# Patient Record
Sex: FEMALE | Race: BLACK OR AFRICAN AMERICAN | NOT HISPANIC OR LATINO
[De-identification: names, ages, dates, MRNs, and addresses within clinical notes are randomized per-mention and may not be internally consistent; named-entity substitution may affect disease eponyms.]

---

## 2020-06-16 DIAGNOSIS — Z01.818 ENCOUNTER FOR OTHER PREPROCEDURAL EXAMINATION: ICD-10-CM

## 2020-06-19 ENCOUNTER — APPOINTMENT (OUTPATIENT)
Dept: SURGERY | Facility: CLINIC | Age: 55
End: 2020-06-19
Payer: COMMERCIAL

## 2020-06-19 VITALS
SYSTOLIC BLOOD PRESSURE: 114 MMHG | TEMPERATURE: 97.8 F | RESPIRATION RATE: 16 BRPM | DIASTOLIC BLOOD PRESSURE: 80 MMHG | OXYGEN SATURATION: 100 % | HEART RATE: 62 BPM

## 2020-06-19 DIAGNOSIS — M79.89 OTHER SPECIFIED SOFT TISSUE DISORDERS: ICD-10-CM

## 2020-06-19 DIAGNOSIS — M54.9 DORSALGIA, UNSPECIFIED: ICD-10-CM

## 2020-06-19 DIAGNOSIS — K80.20 CALCULUS OF GALLBLADDER W/OUT CHOLECYSTITIS W/OUT OBSTRUCTION: ICD-10-CM

## 2020-06-19 DIAGNOSIS — J45.909 UNSPECIFIED ASTHMA, UNCOMPLICATED: ICD-10-CM

## 2020-06-19 DIAGNOSIS — Z78.9 OTHER SPECIFIED HEALTH STATUS: ICD-10-CM

## 2020-06-19 DIAGNOSIS — E66.01 MORBID (SEVERE) OBESITY DUE TO EXCESS CALORIES: ICD-10-CM

## 2020-06-19 DIAGNOSIS — Z83.3 FAMILY HISTORY OF DIABETES MELLITUS: ICD-10-CM

## 2020-06-19 PROCEDURE — 99204 OFFICE O/P NEW MOD 45 MIN: CPT

## 2020-06-19 RX ORDER — MULTIVIT-MIN/IRON/FOLIC ACID/K 18-600-40
CAPSULE ORAL
Refills: 0 | Status: ACTIVE | COMMUNITY

## 2020-06-19 RX ORDER — BACILLUS COAGULANS/INULIN 1B-250 MG
CAPSULE ORAL
Refills: 0 | Status: ACTIVE | COMMUNITY

## 2020-06-19 RX ORDER — MULTIVIT-MIN/FA/LYCOPEN/LUTEIN .4-300-25
TABLET ORAL
Refills: 0 | Status: ACTIVE | COMMUNITY

## 2020-06-19 NOTE — HISTORY OF PRESENT ILLNESS
[de-identified] :  GERARDO ADAMS is a 54 year old female here for consultation for weight loss surgery.  54-year-old female 5 foot 2 266 pounds with a BMI of 48.  She has been having most of her adult life has been on multiple diet programs in the past losing up to 10 pounds at any one time always gaining that weight back she is looking for more permanent solution to her weight loss problem she is interested in a sleeve gastrectomy.\par

## 2020-06-19 NOTE — ASSESSMENT
[FreeTextEntry1] : 54-year-old female 5 foot 2 266 pounds with a BMI of 48 she would like to be considered for a sleeve gastrectomy I believe she would be an excellent candidate she will undergo her usual medical nutritional and psychological work-up attend a preoperative support group meeting and return for second office visit once these are completed the risks benefits and expectations were discussed at length with the patient all of her questions were answered

## 2020-06-23 ENCOUNTER — RESULT REVIEW (OUTPATIENT)
Age: 55
End: 2020-06-23

## 2020-06-24 ENCOUNTER — EMERGENCY (EMERGENCY)
Facility: HOSPITAL | Age: 55
LOS: 1 days | Discharge: HOME | End: 2020-06-24
Admitting: EMERGENCY MEDICINE
Payer: COMMERCIAL

## 2020-06-24 VITALS
HEIGHT: 63 IN | TEMPERATURE: 98 F | SYSTOLIC BLOOD PRESSURE: 111 MMHG | WEIGHT: 266.1 LBS | DIASTOLIC BLOOD PRESSURE: 68 MMHG | HEART RATE: 86 BPM | OXYGEN SATURATION: 100 % | RESPIRATION RATE: 16 BRPM

## 2020-06-24 PROCEDURE — 99284 EMERGENCY DEPT VISIT MOD MDM: CPT

## 2020-06-24 PROCEDURE — 73630 X-RAY EXAM OF FOOT: CPT | Mod: 26,LT

## 2020-06-24 PROCEDURE — 73610 X-RAY EXAM OF ANKLE: CPT | Mod: 26,LT

## 2020-06-24 PROCEDURE — 93971 EXTREMITY STUDY: CPT | Mod: 26,LT

## 2020-06-24 RX ORDER — IBUPROFEN 200 MG
600 TABLET ORAL ONCE
Refills: 0 | Status: COMPLETED | OUTPATIENT
Start: 2020-06-24 | End: 2020-06-24

## 2020-06-24 RX ADMIN — Medication 600 MILLIGRAM(S): at 19:00

## 2020-06-24 NOTE — ED PROVIDER NOTE - NS ED ROS FT
ROS:  GENERAL: No fever, no chills  EYES: no change in vision  HEENT: no trouble swallowing, no trouble speaking  CARDIAC: no chest pain  PULMONARY: no cough, no shortness of breath  GI: no abdominal pain, no nausea, no vomiting, no diarrhea, no constipation  : No dysuria, no frequency, no change in appearance, or odor of urine  SKIN: no rashes  NEURO: no headache, no weakness  MSK: +ankle pain

## 2020-06-24 NOTE — ED PROVIDER NOTE - CLINICAL SUMMARY MEDICAL DECISION MAKING FREE TEXT BOX
55 y/o F PMHx asthma, morbid obesity, here c/o atraumatic left ankle pain x 1 week. Has mild calf tenderness, but no dvt risk factors. Will do xray ankle, venous duplex to r/o dvt, motrin for pain. Ankle pain likely 2/2 OA 2/2 obesity.

## 2020-06-24 NOTE — ED PROVIDER NOTE - PROGRESS NOTE DETAILS
PA Hamilton- patient received at sign out pending Doppler results. Pt xrays were wnl. Doppler negative. Results reviewed and discussed with patient. Advise RICE and NSAID use prn for pain and patient advised to f/u with PCP and ortho/podiatry. Pt understood and agreed with plan. All questions and concerns addressed. Strict return instructions given. No complaints noted.

## 2020-06-24 NOTE — ED ADULT TRIAGE NOTE - HEIGHT IN CM
Reason for Call:  Form, our goal is to have forms completed with 72 hours, however, some forms may require a visit or additional information.    Type of letter, form or note:  CPAP/BIPAPA written order    Who is the form from?: Home medical (if other please explain)    Where did the form come from: form was faxed in    What clinic location was the form placed at?: [place]    Where the form was placed: Given to Feli Patel NP in Dr Brasher's absence     What number is listed as a contact on the form?: [phone]       Additional comments: Fax to [phone] when signed along with 04/03/2019 office visit notes    Call taken on 3/24/2020 at 10:40 AM by Valeriano Vivas CMA         160.02

## 2020-06-24 NOTE — ED ADULT NURSE NOTE - OBJECTIVE STATEMENT
Pt a&ox3, calm and cooperative, c/o of intermittent left ankle pain started last week, radiates to left leg. No relief with motrin. pt denies any other medical discomfort at the moment. respirations even/unlabored, nad noted, pt able to perfprm ROM. pending xray

## 2020-06-24 NOTE — ED PROVIDER NOTE - NSFOLLOWUPINSTRUCTIONS_ED_ALL_ED_FT
Follow up with your PMD within 48-72 hours.  Recommend orthopedic follow up within the week. Referral list provided. Rest, ice and elevate.  Take Motrin 600mg every 8hrs for pain with food.  Ambulate as tolerated with ace wrap. Worsening pain, swelling, numbness, weakness return to ER

## 2020-06-24 NOTE — ED PROVIDER NOTE - PHYSICAL EXAMINATION
Vital signs reviewed.   CONSTITUTIONAL: Well-appearing; well-nourished; in no apparent distress. Non-toxic appearing.   HEAD: Normocephalic, atraumatic.  EYES: PERRL, EOM intact, conjunctiva and sclera WNL.  ENT: normal nose; no rhinorrhea; normal pharynx with no tonsillar hypertrophy, no erythema, no exudate, no lymphadenopathy.  NECK/LYMPH: Supple; non-tender; no cervical lymphadenopathy.  CARD: Normal S1, S2; no lower ext edema diaz ,+left calf tenderness  RESP: Normal chest excursion with respiration; breath sounds clear and equal bilaterally; no wheezes, rhonchi, or rales.  ABD/GI: soft, non-distended; non-tender; no palpable organomegaly, no pulsatile mass.  EXT/MS: no midline tenderness, distal pulses are normal, no pedal edema.  L ankle: minimal swelling noted to lateral ankle, no visible deformity or ecchymosis noted, pain on palpation of lateral joint line, full rom w/ some tenderness, dp pulse 2+, sensation intact, able to ambulate with limp.   SKIN: Normal for age and race; warm; dry; good turgor; no apparent lesions or exudate noted.  NEURO: Awake, alert, oriented x 3, no gross deficits, CN II-XII grossly intact, no motor or sensory deficit noted.  PSYCH: Normal mood; appropriate affect.

## 2020-06-24 NOTE — ED PROVIDER NOTE - OBJECTIVE STATEMENT
53 y/o F PMHx asthma, morbid obesity, here c/o atraumatic left ankle pain x 1 week. States she initially took Motrin w/ relief of pain, however pain then recurred. Normally has edema in diaz lower extremities, but states she has noticed more swelling in the left ankle vs right. Was told by her family to go to the ED to r/o blood clot. No personal or fam hx of blood clots. No ocp use. Social smoker. No recent travel or immobilization. No injuries to the ankle.

## 2020-06-24 NOTE — ED PROVIDER NOTE - PATIENT PORTAL LINK FT
You can access the FollowMyHealth Patient Portal offered by [facility] by registering at the following website: [email or web address]. By joining [employer]’s FollowMyHealth portal, you will also be able to view your health information using other applications (apps) compatible with our system.

## 2023-07-19 PROBLEM — J45.909 UNSPECIFIED ASTHMA, UNCOMPLICATED: Chronic | Status: ACTIVE | Noted: 2020-06-24

## 2023-09-11 ENCOUNTER — APPOINTMENT (OUTPATIENT)
Dept: VASCULAR SURGERY | Facility: CLINIC | Age: 58
End: 2023-09-11
Payer: COMMERCIAL

## 2023-09-11 VITALS
HEART RATE: 70 BPM | HEIGHT: 63 IN | DIASTOLIC BLOOD PRESSURE: 73 MMHG | BODY MASS INDEX: 31.01 KG/M2 | SYSTOLIC BLOOD PRESSURE: 106 MMHG | WEIGHT: 175 LBS

## 2023-09-11 VITALS — HEART RATE: 69 BPM | DIASTOLIC BLOOD PRESSURE: 61 MMHG | SYSTOLIC BLOOD PRESSURE: 93 MMHG

## 2023-09-11 PROCEDURE — 93922 UPR/L XTREMITY ART 2 LEVELS: CPT

## 2023-09-11 PROCEDURE — 99203 OFFICE O/P NEW LOW 30 MIN: CPT

## 2023-09-11 PROCEDURE — 93970 EXTREMITY STUDY: CPT

## 2024-05-02 ENCOUNTER — EMERGENCY (EMERGENCY)
Facility: HOSPITAL | Age: 59
LOS: 1 days | Discharge: HOME | End: 2024-05-02
Attending: EMERGENCY MEDICINE | Admitting: EMERGENCY MEDICINE
Payer: COMMERCIAL

## 2024-05-02 VITALS
RESPIRATION RATE: 17 BRPM | SYSTOLIC BLOOD PRESSURE: 105 MMHG | DIASTOLIC BLOOD PRESSURE: 59 MMHG | TEMPERATURE: 98 F | HEART RATE: 59 BPM | OXYGEN SATURATION: 100 %

## 2024-05-02 VITALS
OXYGEN SATURATION: 99 % | HEART RATE: 62 BPM | RESPIRATION RATE: 16 BRPM | SYSTOLIC BLOOD PRESSURE: 103 MMHG | DIASTOLIC BLOOD PRESSURE: 66 MMHG | TEMPERATURE: 98 F

## 2024-05-02 LAB
ALBUMIN SERPL ELPH-MCNC: 3.3 G/DL — SIGNIFICANT CHANGE UP (ref 3.3–5)
ALP SERPL-CCNC: 62 U/L — SIGNIFICANT CHANGE UP (ref 40–120)
ALT FLD-CCNC: 13 U/L — SIGNIFICANT CHANGE UP (ref 4–33)
ANION GAP SERPL CALC-SCNC: 9 MMOL/L — SIGNIFICANT CHANGE UP (ref 7–14)
ANISOCYTOSIS BLD QL: SLIGHT — SIGNIFICANT CHANGE UP
APPEARANCE UR: CLEAR — SIGNIFICANT CHANGE UP
AST SERPL-CCNC: 17 U/L — SIGNIFICANT CHANGE UP (ref 4–32)
BACTERIA # UR AUTO: NEGATIVE /HPF — SIGNIFICANT CHANGE UP
BASOPHILS # BLD AUTO: 0.03 K/UL — SIGNIFICANT CHANGE UP (ref 0–0.2)
BASOPHILS NFR BLD AUTO: 0.9 % — SIGNIFICANT CHANGE UP (ref 0–2)
BILIRUB SERPL-MCNC: <0.2 MG/DL — SIGNIFICANT CHANGE UP (ref 0.2–1.2)
BILIRUB UR-MCNC: NEGATIVE — SIGNIFICANT CHANGE UP
BUN SERPL-MCNC: 16 MG/DL — SIGNIFICANT CHANGE UP (ref 7–23)
CALCIUM SERPL-MCNC: 8.3 MG/DL — LOW (ref 8.4–10.5)
CAST: 1 /LPF — SIGNIFICANT CHANGE UP (ref 0–4)
CHLORIDE SERPL-SCNC: 107 MMOL/L — SIGNIFICANT CHANGE UP (ref 98–107)
CO2 SERPL-SCNC: 25 MMOL/L — SIGNIFICANT CHANGE UP (ref 22–31)
COLOR SPEC: YELLOW — SIGNIFICANT CHANGE UP
CREAT SERPL-MCNC: 0.91 MG/DL — SIGNIFICANT CHANGE UP (ref 0.5–1.3)
DIFF PNL FLD: ABNORMAL
EGFR: 73 ML/MIN/1.73M2 — SIGNIFICANT CHANGE UP
EOSINOPHIL # BLD AUTO: 0.09 K/UL — SIGNIFICANT CHANGE UP (ref 0–0.5)
EOSINOPHIL NFR BLD AUTO: 2.6 % — SIGNIFICANT CHANGE UP (ref 0–6)
GIANT PLATELETS BLD QL SMEAR: PRESENT — SIGNIFICANT CHANGE UP
GLUCOSE SERPL-MCNC: 83 MG/DL — SIGNIFICANT CHANGE UP (ref 70–99)
GLUCOSE UR QL: NEGATIVE MG/DL — SIGNIFICANT CHANGE UP
HCT VFR BLD CALC: 33.5 % — LOW (ref 34.5–45)
HGB BLD-MCNC: 10.7 G/DL — LOW (ref 11.5–15.5)
IANC: 1.61 K/UL — LOW (ref 1.8–7.4)
KETONES UR-MCNC: NEGATIVE MG/DL — SIGNIFICANT CHANGE UP
LEUKOCYTE ESTERASE UR-ACNC: NEGATIVE — SIGNIFICANT CHANGE UP
LIDOCAIN IGE QN: 18 U/L — SIGNIFICANT CHANGE UP (ref 7–60)
LYMPHOCYTES # BLD AUTO: 1.09 K/UL — SIGNIFICANT CHANGE UP (ref 1–3.3)
LYMPHOCYTES # BLD AUTO: 31.6 % — SIGNIFICANT CHANGE UP (ref 13–44)
MCHC RBC-ENTMCNC: 28.2 PG — SIGNIFICANT CHANGE UP (ref 27–34)
MCHC RBC-ENTMCNC: 31.9 GM/DL — LOW (ref 32–36)
MCV RBC AUTO: 88.2 FL — SIGNIFICANT CHANGE UP (ref 80–100)
MONOCYTES # BLD AUTO: 0.3 K/UL — SIGNIFICANT CHANGE UP (ref 0–0.9)
MONOCYTES NFR BLD AUTO: 8.8 % — SIGNIFICANT CHANGE UP (ref 2–14)
NEUTROPHILS # BLD AUTO: 1.94 K/UL — SIGNIFICANT CHANGE UP (ref 1.8–7.4)
NEUTROPHILS NFR BLD AUTO: 56.1 % — SIGNIFICANT CHANGE UP (ref 43–77)
NITRITE UR-MCNC: NEGATIVE — SIGNIFICANT CHANGE UP
OVALOCYTES BLD QL SMEAR: SLIGHT — SIGNIFICANT CHANGE UP
PH UR: 5.5 — SIGNIFICANT CHANGE UP (ref 5–8)
PLAT MORPH BLD: NORMAL — SIGNIFICANT CHANGE UP
PLATELET # BLD AUTO: 172 K/UL — SIGNIFICANT CHANGE UP (ref 150–400)
PLATELET COUNT - ESTIMATE: NORMAL — SIGNIFICANT CHANGE UP
POIKILOCYTOSIS BLD QL AUTO: SLIGHT — SIGNIFICANT CHANGE UP
POLYCHROMASIA BLD QL SMEAR: SLIGHT — SIGNIFICANT CHANGE UP
POTASSIUM SERPL-MCNC: 4 MMOL/L — SIGNIFICANT CHANGE UP (ref 3.5–5.3)
POTASSIUM SERPL-SCNC: 4 MMOL/L — SIGNIFICANT CHANGE UP (ref 3.5–5.3)
PROT SERPL-MCNC: 6 G/DL — SIGNIFICANT CHANGE UP (ref 6–8.3)
PROT UR-MCNC: NEGATIVE MG/DL — SIGNIFICANT CHANGE UP
RBC # BLD: 3.8 M/UL — SIGNIFICANT CHANGE UP (ref 3.8–5.2)
RBC # FLD: 14.1 % — SIGNIFICANT CHANGE UP (ref 10.3–14.5)
RBC BLD AUTO: ABNORMAL
RBC CASTS # UR COMP ASSIST: 6 /HPF — HIGH (ref 0–4)
SODIUM SERPL-SCNC: 141 MMOL/L — SIGNIFICANT CHANGE UP (ref 135–145)
SP GR SPEC: 1.03 — HIGH (ref 1–1.03)
SQUAMOUS # UR AUTO: 2 /HPF — SIGNIFICANT CHANGE UP (ref 0–5)
UROBILINOGEN FLD QL: 1 MG/DL — SIGNIFICANT CHANGE UP (ref 0.2–1)
WBC # BLD: 3.45 K/UL — LOW (ref 3.8–10.5)
WBC # FLD AUTO: 3.45 K/UL — LOW (ref 3.8–10.5)
WBC UR QL: 1 /HPF — SIGNIFICANT CHANGE UP (ref 0–5)

## 2024-05-02 PROCEDURE — 99284 EMERGENCY DEPT VISIT MOD MDM: CPT

## 2024-05-02 RX ORDER — ACETAMINOPHEN 500 MG
1000 TABLET ORAL ONCE
Refills: 0 | Status: COMPLETED | OUTPATIENT
Start: 2024-05-02 | End: 2024-05-02

## 2024-05-02 RX ORDER — ONDANSETRON 8 MG/1
4 TABLET, FILM COATED ORAL ONCE
Refills: 0 | Status: COMPLETED | OUTPATIENT
Start: 2024-05-02 | End: 2024-05-02

## 2024-05-02 RX ORDER — SODIUM CHLORIDE 9 MG/ML
1000 INJECTION INTRAMUSCULAR; INTRAVENOUS; SUBCUTANEOUS ONCE
Refills: 0 | Status: COMPLETED | OUTPATIENT
Start: 2024-05-02 | End: 2024-05-02

## 2024-05-02 RX ADMIN — SODIUM CHLORIDE 1000 MILLILITER(S): 9 INJECTION INTRAMUSCULAR; INTRAVENOUS; SUBCUTANEOUS at 13:18

## 2024-05-02 RX ADMIN — Medication 1000 MILLIGRAM(S): at 13:48

## 2024-05-02 RX ADMIN — ONDANSETRON 4 MILLIGRAM(S): 8 TABLET, FILM COATED ORAL at 13:18

## 2024-05-02 RX ADMIN — Medication 400 MILLIGRAM(S): at 13:18

## 2024-05-02 RX ADMIN — Medication 1000 MILLIGRAM(S): at 13:33

## 2024-05-02 NOTE — ED ADULT TRIAGE NOTE - CHIEF COMPLAINT QUOTE
pt c/o mid abd pain "burning" with nausea and vomiting. pt reports fever yesterday. pt also c/o lower back pain as well. pt denies dysuria. pt was seen at urgent care yesterday, reports +hematuria on UA.

## 2024-05-02 NOTE — ED PROVIDER NOTE - CLINICAL SUMMARY MEDICAL DECISION MAKING FREE TEXT BOX
58-year female, no chronic medical conditions, presents to ED with complaints of 5 days of lower abdominal pain, dysuria, intermittent nausea and vomiting.  Also reports diffuse headache to forehead since yesterday, no relief with Tylenol.  No numbness, tingling, weakness.  Was seen at urgent care yesterday, found to have fever, referred to ED for further evaluation and management.  Patient notes 1 episode of vomiting 3 days ago.  Decreased p.o. because she is worried about vomiting.  No diarrhea or sick contacts.  Last BM this morning.  History of cholecystectomy, no other abdominal surgeries.  Vital signs stable.  Patient well-appearing, no acute distress, heart regular rate and rhythm, lungs clear, abdomen soft with diffuse suprapubic tenderness, no motor or sensory deficits.  Differential includes but not limited to UTI, gastroenteritis.  Will also assess for electrolyte abnormalities.  Plan for basic labs, lipase, analgesics, antiemetics, ivf, UA+culture.

## 2024-05-02 NOTE — ED PROVIDER NOTE - NSFOLLOWUPCLINICS_GEN_ALL_ED_FT
[facility]  Gastroenterology  [address] [place] [address] [address]  [place] [address]  Phone: [phone]  Fax:

## 2024-05-02 NOTE — ED ADULT NURSE NOTE - NSFALLUNIVINTERV_ED_ALL_ED
Bed/Stretcher in lowest position, wheels locked, appropriate side rails in place/Call bell, personal items and telephone in reach/Instruct patient to call for assistance before getting out of bed/chair/stretcher/Non-slip footwear applied when patient is off stretcher/[place] to call system/Physically safe environment - no spills, clutter or unnecessary equipment/Purposeful proactive rounding/Room/bathroom lighting operational, light cord in reach n/a

## 2024-05-02 NOTE — ED PROVIDER NOTE - PHYSICAL EXAMINATION
CONSTITUTIONAL: Well appearing, awake, alert, and in no apparent distress.  HEAD: normocephalic, atraumatic  ENT: oral mucosa moist  CARDIAC: regular rate and rhythm; no murmurs, rubs, or gallops  RESPIRATORY: normal breath sounds, clear to ascultation bilaterally, no rales, rhonchi, wheezing  GASTROINTESTINAL: abdomen nondistended, soft, no guarding, rebound tenderness; +suprapubic tenderness  MSK: Spine appears normal, range of motion is not limited, no muscle or joint tenderness  NEURO: Alert and oriented, no focal deficits, no motor or sensory deficits.  SKIN: Skin normal color for race, warm, dry and intact. No evidence of rash.  PSYCH: appropriate mood and affect

## 2024-05-02 NOTE — ED PROVIDER NOTE - PROGRESS NOTE DETAILS
Oli PGY1: patient updated on results.  labs and ua unremarkable.  Re-evaluated patient, no abdomen pain.  On re-exam, abdomen soft and non-tender.  Discussed urine culture results.  Patient is feeling well and would like to go home.  Discussed plan for discharge with follow up to GI and PCP, patient understands and agrees.

## 2024-05-02 NOTE — ED PROVIDER NOTE - OBJECTIVE STATEMENT
58-year female, no chronic medical conditions, presents to ED with complaints of 5 days of lower abdominal pain, dysuria, intermittent nausea and vomiting.  Also reports diffuse headache to forehead since yesterday, no relief with Tylenol.  No numbness, tingling, weakness.  Was seen at urgent care yesterday, found to have fever, referred to ED for further evaluation and management.  Patient notes 1 episode of vomiting 3 days ago.  Decreased p.o. because she is worried about vomiting.  No diarrhea or sick contacts.  Last BM this morning.  History of cholecystectomy, no other abdominal surgeries.

## 2024-05-02 NOTE — ED PROVIDER NOTE - ATTENDING CONTRIBUTION TO CARE
58-year female, no chronic medical conditions, presents to ED with complaints of 5 days of lower abdominal pain, dysuria, intermittent nausea and vomiting.  Also reports diffuse headache to forehead since yesterday, no relief with Tylenol.  No numbness, tingling, weakness.  Was seen at urgent care yesterday, found to have fever, referred to ED for further evaluation and management.  Patient notes 1 episode of vomiting 3 days ago.  Decreased p.o. because she is worried about vomiting.  No diarrhea or sick contacts.  Last BM this morning.  History of cholecystectomy, no other abdominal surgeries. 58-year female, no chronic medical conditions, presents to ED with complaints of 5 days of lower abdominal pain, dysuria, intermittent nausea and vomiting.  Also reports diffuse headache to forehead since yesterday, no relief with Tylenol.  No numbness, tingling, weakness.  Was seen at urgent care yesterday, found to have fever, referred to ED for further evaluation and management.  Patient notes 1 episode of vomiting 3 days ago.  Decreased p.o. because she is worried about vomiting.  No diarrhea or sick contacts.  Last BM this morning.  History of cholecystectomy, no other abdominal surgeries.  stable

## 2024-05-02 NOTE — ED ADULT NURSE NOTE - OBJECTIVE STATEMENT
Received patient in Intake 12 c/o abdominal pain, nausea, vomiting. Patient denies SOB, chest pain, fever. Patient is A&OX4, airway patent, breathing unlabored and even, radial pulses palpable, abdomen soft, tender. Labs obtained, 20G IV placed on left arm, medications given as ordered, IV fluid bolus infusing. Side rails up and safety maintained. Call bells within reach.

## 2024-05-02 NOTE — ED PROVIDER NOTE - NSFOLLOWUPINSTRUCTIONS_ED_ALL_ED_FT
You were seen in the Emergency Department for abdominal pain, headache, and low back pain.  Your evaluation today shows your symptoms are not from any dangerous or life-threatening causes.  We recommend you follow up with your primary care physician and gastroenterology.  The information of gastroenterology is provided in your paperwork.  At home we recommend you take ibuprofen and/or acetaminophen as needed for pain.        1) Continue all previously prescribed medications as directed.    2) Follow up with your primary care physician - take copies of your results.    3) Return to the Emergency Department for worsening or persistent symptoms, and/or ANY NEW OR CONCERNING SYMPTOMS.

## 2024-05-02 NOTE — ED PROVIDER NOTE - PATIENT PORTAL LINK FT
You can access the FollowMyHealth Patient Portal offered by [facility] by registering at the following website: [email or web address]. By joining [employer]s FollowMyHealth portal, you will also be able to view your health information using other applications (apps) compatible with our system.

## 2024-05-03 LAB
CULTURE RESULTS: SIGNIFICANT CHANGE UP
SPECIMEN SOURCE: SIGNIFICANT CHANGE UP

## 2024-09-20 ENCOUNTER — APPOINTMENT (OUTPATIENT)
Dept: VASCULAR SURGERY | Facility: CLINIC | Age: 59
End: 2024-09-20
Payer: COMMERCIAL

## 2024-09-20 VITALS
SYSTOLIC BLOOD PRESSURE: 101 MMHG | HEIGHT: 63 IN | DIASTOLIC BLOOD PRESSURE: 67 MMHG | WEIGHT: 182 LBS | BODY MASS INDEX: 32.25 KG/M2 | HEART RATE: 53 BPM | TEMPERATURE: 98.02 F

## 2024-09-20 PROCEDURE — 99212 OFFICE O/P EST SF 10 MIN: CPT

## 2024-09-30 ENCOUNTER — APPOINTMENT (OUTPATIENT)
Dept: SURGERY | Facility: CLINIC | Age: 59
End: 2024-09-30
Payer: COMMERCIAL

## 2024-09-30 VITALS
TEMPERATURE: 97.9 F | HEIGHT: 63 IN | SYSTOLIC BLOOD PRESSURE: 112 MMHG | DIASTOLIC BLOOD PRESSURE: 80 MMHG | WEIGHT: 180 LBS | BODY MASS INDEX: 31.89 KG/M2 | HEART RATE: 59 BPM

## 2024-09-30 PROCEDURE — 99202 OFFICE O/P NEW SF 15 MIN: CPT

## 2024-10-15 ENCOUNTER — OUTPATIENT (OUTPATIENT)
Dept: OUTPATIENT SERVICES | Facility: HOSPITAL | Age: 59
LOS: 1 days | Discharge: HOME | End: 2024-10-15
Payer: COMMERCIAL

## 2024-10-15 VITALS
HEART RATE: 59 BPM | WEIGHT: 189.38 LBS | SYSTOLIC BLOOD PRESSURE: 105 MMHG | DIASTOLIC BLOOD PRESSURE: 68 MMHG | OXYGEN SATURATION: 99 % | TEMPERATURE: 98 F | HEIGHT: 63 IN | RESPIRATION RATE: 18 BRPM

## 2024-10-15 DIAGNOSIS — Z90.3 ACQUIRED ABSENCE OF STOMACH [PART OF]: Chronic | ICD-10-CM

## 2024-10-15 DIAGNOSIS — J45.909 UNSPECIFIED ASTHMA, UNCOMPLICATED: ICD-10-CM

## 2024-10-15 DIAGNOSIS — R22.41 LOCALIZED SWELLING, MASS AND LUMP, RIGHT LOWER LIMB: ICD-10-CM

## 2024-10-15 DIAGNOSIS — Z01.818 ENCOUNTER FOR OTHER PREPROCEDURAL EXAMINATION: ICD-10-CM

## 2024-10-15 LAB
ANION GAP SERPL CALC-SCNC: 4 MMOL/L — LOW (ref 5–17)
BUN SERPL-MCNC: 14 MG/DL — SIGNIFICANT CHANGE UP (ref 7–23)
CALCIUM SERPL-MCNC: 9 MG/DL — SIGNIFICANT CHANGE UP (ref 8.5–10.1)
CHLORIDE SERPL-SCNC: 109 MMOL/L — HIGH (ref 96–108)
CO2 SERPL-SCNC: 29 MMOL/L — SIGNIFICANT CHANGE UP (ref 22–31)
CREAT SERPL-MCNC: 0.82 MG/DL — SIGNIFICANT CHANGE UP (ref 0.5–1.3)
EGFR: 82 ML/MIN/1.73M2 — SIGNIFICANT CHANGE UP
GLUCOSE SERPL-MCNC: 71 MG/DL — SIGNIFICANT CHANGE UP (ref 70–99)
HCT VFR BLD CALC: 37.5 % — SIGNIFICANT CHANGE UP (ref 34.5–45)
HGB BLD-MCNC: 11.9 G/DL — SIGNIFICANT CHANGE UP (ref 11.5–15.5)
MCHC RBC-ENTMCNC: 27.8 PG — SIGNIFICANT CHANGE UP (ref 27–34)
MCHC RBC-ENTMCNC: 31.7 G/DL — LOW (ref 32–36)
MCV RBC AUTO: 87.6 FL — SIGNIFICANT CHANGE UP (ref 80–100)
NRBC # BLD: 0 /100 WBCS — SIGNIFICANT CHANGE UP (ref 0–0)
PLATELET # BLD AUTO: 226 K/UL — SIGNIFICANT CHANGE UP (ref 150–400)
POTASSIUM SERPL-MCNC: 3.7 MMOL/L — SIGNIFICANT CHANGE UP (ref 3.5–5.3)
POTASSIUM SERPL-SCNC: 3.7 MMOL/L — SIGNIFICANT CHANGE UP (ref 3.5–5.3)
RBC # BLD: 4.28 M/UL — SIGNIFICANT CHANGE UP (ref 3.8–5.2)
RBC # FLD: 14 % — SIGNIFICANT CHANGE UP (ref 10.3–14.5)
SODIUM SERPL-SCNC: 142 MMOL/L — SIGNIFICANT CHANGE UP (ref 135–145)
WBC # BLD: 5.86 K/UL — SIGNIFICANT CHANGE UP (ref 3.8–10.5)
WBC # FLD AUTO: 5.86 K/UL — SIGNIFICANT CHANGE UP (ref 3.8–10.5)

## 2024-10-15 PROCEDURE — 93010 ELECTROCARDIOGRAM REPORT: CPT

## 2024-10-15 RX ORDER — SODIUM CHLORIDE 9 MG/ML
3 INJECTION, SOLUTION INTRAMUSCULAR; INTRAVENOUS; SUBCUTANEOUS EVERY 8 HOURS
Refills: 0 | Status: DISCONTINUED | OUTPATIENT
Start: 2024-10-29 | End: 2024-11-13

## 2024-10-15 NOTE — H&P PST ADULT - HISTORY OF PRESENT ILLNESS
59F presents to PST for scheduled wide excision of right thigh mass on 10/29/24 with         59F PMH asthma (denies intubation) presents to PST for scheduled wide excision of right thigh mass on 10/29/24 with

## 2024-10-15 NOTE — H&P PST ADULT - ASSESSMENT
59F presents to PST for scheduled wide excision of right thigh mass on 10/29/24 with Dr.Held CAPRINI SCORE    AGE RELATED RISK FACTORS                                                             x[ ] Age 41-60 years                                            (1 Point)  [ ] Age: 61-74 years                                           (2 Points)                 [ ] Age= 75 years                                                (3 Points)             DISEASE RELATED RISK FACTORS                                                       [ ] Edema in the lower extremities                 (1 Point)                     [ ] Varicose veins                                               (1 Point)                                 [x ] BMI > 25 Kg/m2                                            (1 Point)                                  [ ] Serious infection (ie PNA)                            (1 Point)                     [ ] Lung disease ( COPD, Emphysema)            (1 Point)                                                                          [ ] Acute myocardial infarction                         (1 Point)                  [ ] Congestive heart failure (in the previous month)  (1 Point)         [ ] Inflammatory bowel disease                            (1 Point)                  [ ] Central venous access, PICC or Port               (2 points)       (within the last month)                                                                [ ] Stroke (in the previous month)                        (5 Points)    [ ] Previous or present malignancy                       (2 points)                                                                                                                                                         HEMATOLOGY RELATED FACTORS                                                         [ ] Prior episodes of VTE                                     (3 Points)                     [ ] Positive family history for VTE                      (3 Points)                  [ ] Prothrombin 80919 A                                     (3 Points)                     [ ] Factor V Leiden                                                (3 Points)                        [ ] Lupus anticoagulants                                      (3 Points)                                                           [ ] Anticardiolipin antibodies                              (3 Points)                                                       [ ] High homocysteine in the blood                   (3 Points)                                             [ ] Other congenital or acquired thrombophilia      (3 Points)                                                [ ] Heparin induced thrombocytopenia                  (3 Points)                                        MOBILITY RELATED FACTORS  [ ] Bed rest                                                         (1 Point)  [ ] Plaster cast                                                    (2 points)  [ ] Bed bound for more than 72 hours           (2 Points)    GENDER SPECIFIC FACTORS  [ ] Pregnancy or had a baby within the last month   (1 Point)  [ ] Post-partum < 6 weeks                                   (1 Point)  [ ] Hormonal therapy  or oral contraception   (1 Point)  [ ] History of pregnancy complications              (1 point)  [ ] Unexplained or recurrent              (1 Point)    OTHER RISK FACTORS                                           (1 Point)  [ ] BMI >40, smoking, diabetes requiring insulin, chemotherapy  blood transfusions and length of surgery over 2 hours    SURGERY RELATED RISK FACTORS  [ ]  Section within the last month     (1 Point)  [ ] Minor surgery                                                  (1 Point)  [ ] Arthroscopic surgery                                       (2 Points)  [ x] Planned major surgery lasting more            (2 Points)      than 45 minutes     [ ] Elective hip or knee joint replacement       (5 points)       surgery                                                TRAUMA RELATED RISK FACTORS  [ ] Fracture of the hip, pelvis, or leg                       (5 Points)  [ ] Spinal cord injury resulting in paralysis             (5 points)       (in the previous month)    [ ] Paralysis  (less than 1 month)                             (5 Points)  [ ] Multiple Trauma within 1 month                        (5 Points)    Total Score [      4  ]    Caprini Score 0-2: Low Risk, NO VTE prophylaxis required for most patients, encourage ambulation  Caprini Score 3-6: Moderate Risk , pharmacologic VTE prophylaxis is indicated for most patients (in the absence of contraindications)  Caprini Score Greater than or =7: High risk, pharmocologic VTE prophylaxis indicated for most patients (in the absence of contraindications)

## 2024-10-15 NOTE — H&P PST ADULT - ANESTHESIA, PREVIOUS REACTION, PROFILE
Protocol For Photochemotherapy For Severe Photoresponsive Dermatoses: Puva: The patient received Photochemotherapy for severe photoresponsive dermatoses: PUVA requiring at least 4 to 8 hours of care under direct physician supervision. Protocol For Puva: The patient received PUVA. Protocol For Photochemotherapy: Petrolatum And Broad Band Uvb: The patient received Photochemotherapy: Petrolatum and Broad Band UVB. Protocol For Bath Puva: The patient received Bath PUVA. Comments On Previous Treatment: No c/o redness, burning, or pain. Protocol For Photochemotherapy: Petrolatum And Nbuvb: The patient received Photochemotherapy: Petrolatum and NBUVB (petrolatum applied to all lesions prior to phototherapy). Protocol For Photochemotherapy: Mineral Oil And Broad Band Uvb: The patient received Photochemotherapy: Mineral Oil and Broad Band UVB. Protocol For Photochemotherapy For Severe Photoresponsive Dermatoses: Tar And Nbuvb (Goeckerman Treatment): The patient received Photochemotherapy for severe photoresponsive dermatoses: Tar and NBUVB (Goeckerman treatment) requiring at least 4 to 8 hours of care under direct physician supervision. Protocol For Nb Uva: The patient received NB UVA. Post-Care Instructions: I reviewed with the patient in detail post-care instructions. Patient is to wear sun protection. Patients may expect sunburn like redness, discomfort and scabbing. Skin Type: I Irradiance (Optional- Include Units): 1.59 Protocol For Nbuvb: The patient received NBUVB. Protocol For Uva1: The patient received UVA1. Total Body Energy: Amadeoata 37 Detail Level: Generalized Treatment Number: 21 none/unknown Protocol For Photochemotherapy: Tar And Nbuvb (Goeckerman Treatment): The patient received Photochemotherapy: Tar and NBUVB (Goeckerman treatment). Protocol For Broad Band Uvb: The patient received Broad Band UVB. Protocol For Photochemotherapy For Severe Photoresponsive Dermatoses: Petrolatum And Broad Band Uvb: The patient received Photochemotherapyfor severe photoresponsive dermatoses: Petrolatum and Broad Band UVB requiring at least 4 to 8 hours of care under direct physician supervision. Consent: Written consent obtained. The risks were reviewed with the patient including but not limited to: burn, pigmentary changes, pain, blistering, scabbing, redness, increased risk of skin cancers, and the remote possibility of scarring. Protocol For Photochemotherapy For Severe Photoresponsive Dermatoses: Petrolatum And Nbuvb: The patient received Photochemotherapy for severe photoresponsive dermatoses: Petrolatum and NBUVB requiring at least 4 to 8 hours of care under direct physician supervision. Protocol: Photochemotherapy: Baby Oil and NBUVB Protocol For Photochemotherapy: Mineral Oil And Nbuvb: The patient received Photochemotherapy: Mineral Oil and NBUVB (mineral oil applied to all lesions prior to phototherapy). Total Body Time: 4:21min Protocol For Photochemotherapy For Severe Photoresponsive Dermatoses: Tar And Broad Band Uvb (Goeckerman Treatment): The patient received Photochemotherapy for severe photoresponsive dermatoses: Tar and Broad Band UVB (Goeckerman treatment) requiring at least 4 to 8 hours of care under direct physician supervision. Total Treatment Time: 10 min Protocol For Photochemotherapy: Tar And Broad Band Uvb (Goeckerman Treatment): The patient received Photochemotherapy: Tar and Broad Band UVB (Goeckerman treatment). Name Of Supervising Technician: [address] [place] [address] Protocol For Photochemotherapy: Baby Oil And Nbuvb: The patient received Photochemotherapy: Baby Oil and NBUVB (baby oil applied to all lesions prior to phototherapy). Protocol For Photochemotherapy: Triamcinolone Ointment And Nbuvb: The patient received Photochemotherapy: Triamcinolone and NBUVB (triamcinolone ointment applied to all lesions prior to phototherapy). Protocol For Protocol For Photochemotherapy For Severe Photoresponsive Dermatoses: Bath Puva: The patient received Photochemotherapy for severe photoresponsive dermatoses: Bath PUVA requiring at least 4 to 8 hours of care under direct physician supervision. Protocol For Uva: The patient received UVA.

## 2024-10-29 ENCOUNTER — TRANSCRIPTION ENCOUNTER (OUTPATIENT)
Age: 59
End: 2024-10-29

## 2024-10-29 ENCOUNTER — RESULT REVIEW (OUTPATIENT)
Age: 59
End: 2024-10-29

## 2024-10-29 ENCOUNTER — OUTPATIENT (OUTPATIENT)
Dept: OUTPATIENT SERVICES | Facility: HOSPITAL | Age: 59
LOS: 1 days | Discharge: HOME | End: 2024-10-29
Payer: COMMERCIAL

## 2024-10-29 ENCOUNTER — APPOINTMENT (OUTPATIENT)
Dept: SURGERY | Facility: HOSPITAL | Age: 59
End: 2024-10-29

## 2024-10-29 VITALS
DIASTOLIC BLOOD PRESSURE: 66 MMHG | SYSTOLIC BLOOD PRESSURE: 108 MMHG | OXYGEN SATURATION: 97 % | HEART RATE: 58 BPM | RESPIRATION RATE: 16 BRPM

## 2024-10-29 VITALS
HEART RATE: 61 BPM | HEIGHT: 63 IN | OXYGEN SATURATION: 95 % | DIASTOLIC BLOOD PRESSURE: 66 MMHG | SYSTOLIC BLOOD PRESSURE: 104 MMHG | TEMPERATURE: 98 F | RESPIRATION RATE: 16 BRPM | WEIGHT: 182.98 LBS

## 2024-10-29 DIAGNOSIS — Z90.3 ACQUIRED ABSENCE OF STOMACH [PART OF]: Chronic | ICD-10-CM

## 2024-10-29 PROCEDURE — 88305 TISSUE EXAM BY PATHOLOGIST: CPT | Mod: 26

## 2024-10-29 PROCEDURE — 27339 EXC THIGH/KNEE TUM DEP 5CM/>: CPT | Mod: GC,RT

## 2024-10-29 RX ORDER — HYDROMORPHONE HCL/0.9% NACL/PF 6 MG/30 ML
1 PATIENT CONTROLLED ANALGESIA SYRINGE INTRAVENOUS
Refills: 0 | Status: DISCONTINUED | OUTPATIENT
Start: 2024-10-29 | End: 2024-10-29

## 2024-10-29 RX ORDER — ONDANSETRON HYDROCHLORIDE 2 MG/ML
4 INJECTION, SOLUTION INTRAMUSCULAR; INTRAVENOUS ONCE
Refills: 0 | Status: DISCONTINUED | OUTPATIENT
Start: 2024-10-29 | End: 2024-11-13

## 2024-10-29 RX ORDER — HYDROMORPHONE HCL/0.9% NACL/PF 6 MG/30 ML
0.5 PATIENT CONTROLLED ANALGESIA SYRINGE INTRAVENOUS
Refills: 0 | Status: DISCONTINUED | OUTPATIENT
Start: 2024-10-29 | End: 2024-10-29

## 2024-10-29 RX ADMIN — Medication 125 MILLILITER(S): at 19:06

## 2024-10-29 RX ADMIN — SODIUM CHLORIDE 3 MILLILITER(S): 9 INJECTION, SOLUTION INTRAMUSCULAR; INTRAVENOUS; SUBCUTANEOUS at 17:36

## 2024-10-29 RX ADMIN — SODIUM CHLORIDE 3 MILLILITER(S): 9 INJECTION, SOLUTION INTRAMUSCULAR; INTRAVENOUS; SUBCUTANEOUS at 19:07

## 2024-10-29 NOTE — ASU DISCHARGE PLAN (ADULT/PEDIATRIC) - CARE PROVIDER_API CALL
Cody Grant  Surgery  [address] [place] [address]  Phone: [phone]  Fax: [phone]  Follow Up Time: 2 weeks

## 2024-10-29 NOTE — ASU DISCHARGE PLAN (ADULT/PEDIATRIC) - NS MD DC FALL RISK RISK
For information on Fall & Injury Prevention, visit: [email or web address] OR  [email or web address] OR  https://www.cdc.gov/steadi/patient.html

## 2024-10-29 NOTE — ASU DISCHARGE PLAN (ADULT/PEDIATRIC) - FINANCIAL ASSISTANCE
[facility] provides services at a reduced cost to those who are determined to be eligible through [facility]’s financial assistance program. Information regarding [facility]’s financial assistance program can be found by going to [email or web address] or by calling [phone].

## 2024-10-29 NOTE — ASU DISCHARGE PLAN (ADULT/PEDIATRIC) - ASU DC SPECIAL INSTRUCTIONSFT
PAIN: Please alternate between ibuprofen and tylenol every 3 hours for pain.    WOUND CARE:  Please keep incisions clean and dry. Please do not Scrub or rub incisions. Do not use lotion or powder on incisions.   BATHING: You may shower and/or sponge bathe. You may use warm soapy water in the shower and rinse, pat dry.  ACTIVITY: No heavy lifting or straining. Otherwise, you may return to your usual level of physical activity. If you are taking narcotic pain medication DO NOT drive a car, operate machinery or make important decisions.  DIET: Return to your usual diet.  NOTIFY YOUR SURGEON IF YOU HAVE: any bleeding that does not stop, any pus draining from your wound(s), any fever (over 100.4 F) persistent nausea/vomiting, or if your pain is not controlled on your discharge pain medications, unable to urinate.  FOLLOW UP:  1. Please follow up with your primary care physician in one week regarding your hospitalization, bring copies of your discharge paperwork.  2. Please follow up with your surgeon, Dr. Grant in 1-2 weeks

## 2024-10-29 NOTE — ASU PATIENT PROFILE, ADULT - PATIENT’S MOTHER’S MAIDEN LAST NAME (INFO USED BY THE IMMUNIZATION REGISTRY):
smoked. She has never used smokeless tobacco.    Scheduled Meds:   albuterol sulfate HFA  2 puff Inhalation 4x daily    raNITIdine  150 mg Oral BID    morphine  60 mg Oral BID    carvedilol  25 mg Oral BID WC    citalopram  40 mg Oral Daily    sodium chloride flush  10 mL Intravenous 2 times per day    enoxaparin  40 mg Subcutaneous Daily    vancomycin  1 g Intravenous Q24H    levofloxacin  750 mg Intravenous Q48H    insulin lispro  0-12 Units Subcutaneous TID WC    insulin lispro  0-6 Units Subcutaneous Nightly    sodium chloride (Inhalant)        influenza virus vaccine  0.5 mL Intramuscular Prior to discharge     Continuous Infusions:   dextrose       PRN Meds:  albuterol sulfate HFA, furosemide, sodium chloride flush, acetaminophen **OR** acetaminophen, polyethylene glycol, promethazine **OR** ondansetron, glucose, dextrose, glucagon (rDNA), dextrose, morphine    ALLERGIES:  Patient is allergic to sucralfate; ampicillin; ampicillin-sulbactam sodium; and sulfamethoxazole-trimethoprim. REVIEW OF SYSTEMS:  Constitutional: Negative for fever  HENT: Negative for sore throat  Eyes: Negative for redness   Respiratory: + for dyspnea, cough  Cardiovascular: Negative for chest pain  Gastrointestinal: Negative for vomiting, diarrhea   Genitourinary: Negative for hematuria   Musculoskeletal: Negative for arthralgias   Skin: Negative for rash  Neurological: Negative for syncope  Hematological: Negative for adenopathy  Psychiatric/Behavorial: Negative for anxiety    PHYSICAL EXAM:  Blood pressure (!) 164/73, pulse 82, temperature 97.8 °F (36.6 °C), temperature source Oral, resp. rate 20, height 5' 4\" (1.626 m), weight 235 lb (106.6 kg), SpO2 92 %, not currently breastfeeding.' on 8L NC  Gen: No distress. Increased BMI  Eyes: PERRL. No sclera icterus. No conjunctival injection. ENT: No discharge. Pharynx clear. Neck: Trachea midline. No obvious mass. Resp: No accessory muscle use. No crackles.  No wheezes. No rhonchi. No dullness on percussion. CV: Regular rate. Regular rhythm. No murmur or rub. No edema. Peripheral pulses are 2+. Capillary refill is less than 3 seconds. GI: Non-tender. Non-distended. No hernia. Skin: Warm and dry. No nodule on exposed extremities. Lymph: No cervical LAD. No supraclavicular LAD. M/S: No cyanosis. No joint deformity. No clubbing. Neuro: Awake. Alert. Moves all four extremities. Psych: Oriented x 3. No anxiety. LABS:  CBC:   Recent Labs     11/07/20  1517   WBC 7.3   HGB 9.1*   HCT 27.5*   MCV 91.2        BMP:   Recent Labs     11/07/20  1517   *   K 4.0   CL 95*   CO2 29   BUN 14   CREATININE 1.5*     LIVER PROFILE:   Recent Labs     11/07/20  1517   AST 12*   ALT 7*   BILITOT 1.2*   ALKPHOS 62     PT/INR: No results for input(s): PROTIME, INR in the last 72 hours. APTT: No results for input(s): APTT in the last 72 hours. UA:  Recent Labs     11/07/20  1555   COLORU DARK YELLOW*   PHUR 5.0   WBCUA 6-9*   RBCUA 3-4   MUCUS 1+*   BACTERIA 2+*   CLARITYU Clear   SPECGRAV >=1.030   LEUKOCYTESUR TRACE*   UROBILINOGEN 0.2   BILIRUBINUR SMALL*   BLOODU Negative   GLUCOSEU Negative     No results for input(s): PHART, ARO3SGZ, PO2ART in the last 72 hours.     Chest imaging was reviewed by me and showed :    Chest CT 11/7/20: Mediastinum: Enlarged subcarinal and left hilar lymph nodes are noted   measuring 2.2 and 1.8 cm in short axis respectively. [place] confluent soft   tissue along the inferior left internal mammary chain.  The heart and   pericardium demonstrate no acute abnormality.  There is no acute abnormality   of the thoracic aorta.       Lungs/pleura: Multifocal patchy consolidative opacities are present.  Some of   these opacities have a nodular appearance in the left upper lobe and medial   right lower lobe as well.  Moderate-sized left pleural effusion with   suspected pleural thickening posteriorly.  Trace right pleural fluid.  The central airway is patent. Head CT - no acute intracranial abnormality      CT report [facility] 10/19/20:   No evidence of pulmonary embolism. Multiple left lung nodules and pleural nodularity compatible with metastatic disease. Left hilar lymphadenopathy, reactive or metastatic. Moderate left pleural effusion    ASSESSMENT:  ·  Acute respiratory failure with hypoxemia  · Healthcare associated pneumonia - probable gram negative, risk for methicillin resistant Staph aureus as well   · Nodular opacities in lung- may represent mets from breast cancer  · L pleural effusion- recurrent  · afib with RVR    PLAN:  · Supplemental oxygen to maintain SaO2 >92%; wean as tolerated   · abx: Vancomycin, cefepime and Levaquin day #1  · Follow-up cultures  · Left-sided ultrasound-guided thoracentesis tomorrow  · Pain control  · Rate control per IM    Thank you for the consult. Campbell

## 2024-10-30 ENCOUNTER — TRANSCRIPTION ENCOUNTER (OUTPATIENT)
Age: 59
End: 2024-10-30

## 2024-11-04 DIAGNOSIS — R22.41 LOCALIZED SWELLING, MASS AND LUMP, RIGHT LOWER LIMB: ICD-10-CM

## 2024-11-04 DIAGNOSIS — J45.909 UNSPECIFIED ASTHMA, UNCOMPLICATED: ICD-10-CM

## 2024-11-04 DIAGNOSIS — Z98.84 BARIATRIC SURGERY STATUS: ICD-10-CM

## 2024-11-04 LAB — SURGICAL PATHOLOGY STUDY: SIGNIFICANT CHANGE UP

## 2024-11-11 ENCOUNTER — APPOINTMENT (OUTPATIENT)
Dept: SURGERY | Facility: CLINIC | Age: 59
End: 2024-11-11
Payer: COMMERCIAL

## 2024-11-11 VITALS
TEMPERATURE: 97.8 F | WEIGHT: 180 LBS | DIASTOLIC BLOOD PRESSURE: 72 MMHG | BODY MASS INDEX: 31.89 KG/M2 | SYSTOLIC BLOOD PRESSURE: 110 MMHG | HEIGHT: 63 IN | HEART RATE: 63 BPM

## 2024-11-11 DIAGNOSIS — F17.200 NICOTINE DEPENDENCE, UNSPECIFIED, UNCOMPLICATED: ICD-10-CM

## 2024-11-11 PROCEDURE — 99024 POSTOP FOLLOW-UP VISIT: CPT

## 2024-11-21 NOTE — ASU PATIENT PROFILE, ADULT - TOBACCO USE
Light tobacco smoker
Detail Level: Detailed
Quality 358: Patient-Centered Surgical Risk Assessment And Communication: Documentation of patient-specific risk assessment with a risk calculator based on multi-institutional clinical data, the specific risk calculator used, and communication of risk assessment from risk calculator with the patient or family.

## 2025-09-19 ENCOUNTER — APPOINTMENT (OUTPATIENT)
Dept: VASCULAR SURGERY | Facility: CLINIC | Age: 60
End: 2025-09-19
Payer: COMMERCIAL

## 2025-09-19 VITALS
HEIGHT: 63 IN | SYSTOLIC BLOOD PRESSURE: 107 MMHG | BODY MASS INDEX: 35.44 KG/M2 | TEMPERATURE: 97.9 F | DIASTOLIC BLOOD PRESSURE: 71 MMHG | HEART RATE: 52 BPM | WEIGHT: 200 LBS

## 2025-09-19 DIAGNOSIS — I87.2 VENOUS INSUFFICIENCY (CHRONIC) (PERIPHERAL): ICD-10-CM

## 2025-09-19 PROCEDURE — 93970 EXTREMITY STUDY: CPT

## 2025-09-19 PROCEDURE — 99214 OFFICE O/P EST MOD 30 MIN: CPT

## 2025-09-19 PROCEDURE — 93923 UPR/LXTR ART STDY 3+ LVLS: CPT

## 2025-09-21 PROBLEM — I87.2 VENOUS INSUFFICIENCY: Status: ACTIVE | Noted: 2025-09-21

## (undated) DEVICE — SOL IRR POUR NS 0.9% 500ML

## (undated) DEVICE — SUT VICRYL 3-0 18" SH (POP-OFF)

## (undated) DEVICE — NDL HYPO SAFE 22G X 1.5" (BLACK)

## (undated) DEVICE — VENODYNE/SCD SLEEVE CALF MEDIUM

## (undated) DEVICE — ELCTR GROUNDING PAD ADULT COVIDIEN

## (undated) DEVICE — PACK MINOR WITH LAP

## (undated) DEVICE — GLV 7.5 PROTEXIS (WHITE)

## (undated) DEVICE — WARMING BLANKET UPPER ADULT

## (undated) DEVICE — DRAPE 3/4 SHEET 52X76"

## (undated) DEVICE — FRA-ESU BOVIE FORCE TRIAD T7J19717DX: Type: DURABLE MEDICAL EQUIPMENT

## (undated) DEVICE — SUT MONOCRYL 4-0 27" PS-2 UNDYED

## (undated) DEVICE — POSITIONER STRAP ARMBOARD VELCRO TS-30

## (undated) DEVICE — SYR LUER LOK 10CC